# Patient Record
Sex: MALE | Race: WHITE | NOT HISPANIC OR LATINO | URBAN - METROPOLITAN AREA
[De-identification: names, ages, dates, MRNs, and addresses within clinical notes are randomized per-mention and may not be internally consistent; named-entity substitution may affect disease eponyms.]

---

## 2018-11-20 ENCOUNTER — IMPORTED ENCOUNTER (OUTPATIENT)
Dept: URBAN - METROPOLITAN AREA CLINIC 50 | Facility: CLINIC | Age: 83
End: 2018-11-20

## 2019-08-19 NOTE — PATIENT DISCUSSION
08/19/19 Dispense of lens today not ready. Reviewed I/R for pt. SHe can  next set and begin wearing when in. Need to reorder OU with power and fit changes.

## 2020-07-29 NOTE — PROCEDURE NOTE: CLINICAL
PROCEDURE NOTE: Punctal Plugs, Silicone #1 OS. Diagnosis: Dysfunctional Tear Syndrome. Prior to treatment, the risks/benefits/alternatives were discussed. The patient wished to proceed with procedure. One drop of proparacaine was placed and a drop of lidocaine gel was placed over the puncta. 0.8 mm permanent silicone plugs were inserted in * eyelids. Patient tolerated procedure well. There were no complications. Post procedure instructions given. Ankita Garland

## 2021-04-17 ASSESSMENT — VISUAL ACUITY
OS_SC: 20/20
OD_OTHER: <20/400.
OD_BAT: <20/400
OD_SC: 20/25
OS_OTHER: <20/400.
OS_BAT: <20/400

## 2021-04-17 ASSESSMENT — TONOMETRY
OD_IOP_MMHG: 15
OS_IOP_MMHG: 15

## 2021-06-21 NOTE — PATIENT DISCUSSION
Celluvisc OU qhs.
Discussed condition and exacerbating conditions/situations (e.g., dry/arid environments, overhead fans, air conditioners, side effect of medications).
Follow up with Dr. 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
Good IOP control with current meds.  Continue Istalol OU BID, continue Latanoprost OU QHS.
Instructed to call immediately if any new distortion, blurring, decreased vision or eye pain.
Monitor.
New OD is final. Reorder with OS with black dot to insert and loosen edges.
No Glasses Prescription given to patient.
OCT ON OU done today; results reviewed with patient.
Order new pair of sclerals with changes. OR OD and added toric haptic OS. RTC/F/U.
Patient advised to call if any problems, questions, or concerns.
Patient made aware of 24/7 emergency services.
Recommended artificial tears to use: 1 drop 4x a day in both eyes.
BPH (benign prostatic hyperplasia)

## 2021-11-09 NOTE — PATIENT DISCUSSION
Follow up with Dr. 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

## 2021-11-09 NOTE — PATIENT DISCUSSION
24-2 VF OU done today; results reviewed with patient.  Patient's VF was done with trial lenses and not scleral CLs.

## 2021-12-07 NOTE — PATIENT DISCUSSION
24-2 VF OU done today; results reviewed with patient.  Results more reliable today than previous VFs.

## 2024-03-25 NOTE — PATIENT DISCUSSION
08/19/19 Dispense of lens today not ready. Reviewed I/R for pt. SHe can  next set and begin wearing when in. Need to reorder OU with power and fit changes. Most Recent JODY 7 Score       JODY 7 Score JODY 7 Score   3/25/2024   8:40 AM 0   Recent PHQ 2/9 Score    PHQ 2:  PHQ 2 Score Adult PHQ 2 Score Adult PHQ 2 Interpretation Little interest or pleasure in activity?   3/25/2024   8:52 AM 0 No further screening needed 0       PHQ 9:  PHQ 9 Score Adult PHQ 9 Score   3/25/2024   8:52 AM 0

## 2024-09-18 NOTE — PATIENT DISCUSSION
Patient made aware of 24/7 emergency services. KANWAL (obstructive sleep apnea) KANWAL (obstructive sleep apnea) KANWAL (obstructive sleep apnea) KANWAL (obstructive sleep apnea) KANWAL (obstructive sleep apnea) parent/wage earner, full time